# Patient Record
Sex: FEMALE | Race: OTHER | NOT HISPANIC OR LATINO | URBAN - METROPOLITAN AREA
[De-identification: names, ages, dates, MRNs, and addresses within clinical notes are randomized per-mention and may not be internally consistent; named-entity substitution may affect disease eponyms.]

---

## 2022-07-09 ENCOUNTER — TELEPHONE ENCOUNTER (OUTPATIENT)
Dept: URBAN - METROPOLITAN AREA CLINIC 121 | Facility: CLINIC | Age: 72
End: 2022-07-09

## 2022-07-09 RX ORDER — CHLORTHALIDONE 50 MG/1
TABLET ORAL TWICE A DAY
Refills: 0 | OUTPATIENT
Start: 2018-03-05 | End: 2018-05-16

## 2022-07-10 ENCOUNTER — TELEPHONE ENCOUNTER (OUTPATIENT)
Dept: URBAN - METROPOLITAN AREA CLINIC 121 | Facility: CLINIC | Age: 72
End: 2022-07-10

## 2022-07-10 RX ORDER — CHLORTHALIDONE 50 MG/1
TABLET ORAL TWICE A DAY
Refills: 0 | Status: ACTIVE | COMMUNITY
Start: 2018-05-16

## 2022-07-10 RX ORDER — HYDROCORTISONE ACETATE 25 MG/1
SUPPOSITORY RECTAL ONCE A DAY
Refills: 0 | Status: ACTIVE | COMMUNITY
Start: 2012-11-26

## 2023-09-06 ENCOUNTER — WEB ENCOUNTER (OUTPATIENT)
Dept: URBAN - METROPOLITAN AREA CLINIC 60 | Facility: CLINIC | Age: 73
End: 2023-09-06

## 2023-09-06 ENCOUNTER — DASHBOARD ENCOUNTERS (OUTPATIENT)
Age: 73
End: 2023-09-06

## 2023-09-06 ENCOUNTER — OFFICE VISIT (OUTPATIENT)
Dept: URBAN - METROPOLITAN AREA CLINIC 60 | Facility: CLINIC | Age: 73
End: 2023-09-06
Payer: MEDICARE

## 2023-09-06 VITALS
BODY MASS INDEX: 36.91 KG/M2 | WEIGHT: 188 LBS | OXYGEN SATURATION: 100 % | DIASTOLIC BLOOD PRESSURE: 80 MMHG | HEIGHT: 60 IN | TEMPERATURE: 97.5 F | SYSTOLIC BLOOD PRESSURE: 150 MMHG | HEART RATE: 74 BPM | RESPIRATION RATE: 20 BRPM

## 2023-09-06 DIAGNOSIS — Z86.010 PERSONAL HISTORY OF COLONIC POLYPS: ICD-10-CM

## 2023-09-06 PROCEDURE — 99203 OFFICE O/P NEW LOW 30 MIN: CPT | Performed by: NURSE PRACTITIONER

## 2023-09-06 RX ORDER — ERGOCALCIFEROL CAPSULES, 1.25 MG/1
1 CAPSULE CAPSULE ORAL
Status: ACTIVE | COMMUNITY

## 2023-09-06 RX ORDER — IBUPROFEN 800 MG/1
1 TABLET WITH FOOD OR MILK AS NEEDED TABLET, FILM COATED ORAL
Status: ACTIVE | COMMUNITY

## 2023-09-06 RX ORDER — LISINOPRIL 5 MG/1
1 TABLET TABLET ORAL ONCE A DAY
Status: ACTIVE | COMMUNITY

## 2023-09-06 RX ORDER — ALENDRONATE SODIUM 70 MG/1
1 TABLET 30 MINUTES BEFORE THE FIRST FOOD, BEVERAGE OR MEDICINE OF THE DAY WITH PLAIN WATER TABLET ORAL
Status: ACTIVE | COMMUNITY

## 2023-09-06 RX ORDER — METFORMIN HYDROCHLORIDE 850 MG/1
1 TABLET WITH A MEAL TABLET, FILM COATED ORAL ONCE A DAY
Status: ACTIVE | COMMUNITY

## 2023-09-06 RX ORDER — ATORVASTATIN CALCIUM 10 MG/1
1 TABLET TABLET, FILM COATED ORAL ONCE A DAY
Status: ACTIVE | COMMUNITY

## 2023-09-06 RX ORDER — DICYCLOMINE HYDROCHLORIDE 10 MG/1
2 CAPSULES CAPSULE ORAL THREE TIMES A DAY
Status: ACTIVE | COMMUNITY

## 2023-09-06 NOTE — PHYSICAL EXAM NEUROLOGIC:
Oriented to person, place and time. No gross deficit noted. , Oriented to person, place and time. No gross deficit noted. , Oriented to person, place and time. No gross deficit noted.

## 2023-09-06 NOTE — PHYSICAL EXAM PSYCH:
Normal mood with appropriate affect  , Normal mood with appropriate affect  , Normal mood with appropriate affect

## 2023-09-06 NOTE — PHYSICAL EXAM CHEST:
No lesions,  no deformities, chest wall non-tender,  no masses present, breathing is unlabored without, normal breath sounds.  , No lesions,  no deformities, chest wall non-tender,  no masses present, breathing is unlabored without, normal breath sounds.  , No lesions,  no deformities, chest wall non-tender,  no masses present, breathing is unlabored without, normal breath sounds.

## 2023-09-06 NOTE — HPI-TODAY'S VISIT:
3/18 Patient here for her screening colonoscopy, she has no GI complaints. Denies any CRC, last colonoscopy in 2012 with evidence of hyperplastic polyps. Patient will have colonoscopy. 5/18 Patient here today in good general state, her colonoscopy shows internal hemorrhoids and one hyperplastic polyp. Denies any GI problems. 9/23 Patient last colonoscopy was done more than 5 years ago.  The procedure shows evidence of a small benign polyp.  Patient denies any personal or family history of colon rectal cancer, rectal bleeding, or change in her bowel habits.  Today she is in good general state she has no GI complaints.

## 2023-09-06 NOTE — PHYSICAL EXAM NECK/THYROID:
Normal appearance, without tenderness upon palpation, no deformities, trachea midline, no masses , thyroid nontender. , Normal appearance, without tenderness upon palpation, no deformities, trachea midline, no masses , thyroid nontender. , Normal appearance, without tenderness upon palpation, no deformities, trachea midline, no masses , thyroid nontender.

## 2023-09-06 NOTE — PHYSICAL EXAM CARDIOVASCULAR:
No edema,  no murmurs,  regular rate and rhythm. S1 and S2 present and normal , No edema,  no murmurs,  regular rate and rhythm. S1 and S2 present and normal , No edema,  no murmurs,  regular rate and rhythm. S1 and S2 present and normal

## 2023-09-06 NOTE — PHYSICAL EXAM SKIN:
No rashes, no jaundice present , good turgor , no masses. , No rashes, no jaundice present , good turgor , no masses. , No rashes, no jaundice present , good turgor , no masses.

## 2023-09-06 NOTE — PHYSICAL EXAM MUSCULOSKELETAL:
Normal gait and station , no tenderness or deformities present. Upper and lower extremities normal. , Normal gait and station , no tenderness or deformities present. Upper and lower extremities normal. , Normal gait and station , no tenderness or deformities present. Upper and lower extremities normal.

## 2023-09-06 NOTE — PHYSICAL EXAM HENT:
Head,  normocephalic,  atraumatic,  Face, within normal limits,  Ears,  External ears within normal limits,  Nose/Nasopharynx,  External nose  normal appearance, no nasal discharge,  Mouth and Throat,  Oral cavity appearance normal. Mucosa normal. Lips,  Appearance normal  , Head,  normocephalic,  atraumatic,  Face, within normal limits,  Ears,  External ears within normal limits,  Nose/Nasopharynx,  External nose  normal appearance, no nasal discharge,  Mouth and Throat,  Oral cavity appearance normal. Mucosa normal. Lips,  Appearance normal  , Head,  normocephalic,  atraumatic,  Face, within normal limits,  Ears,  External ears within normal limits,  Nose/Nasopharynx,  External nose  normal appearance, no nasal discharge,  Mouth and Throat,  Oral cavity appearance normal. Mucosa normal. Lips,  Appearance normal

## 2023-09-06 NOTE — PHYSICAL EXAM EYES:
Conjunctivae and eyelids appear normal, Sclerae: White without injection or jaundice. , Conjunctivae and eyelids appear normal, Sclerae: White without injection or jaundice. , Conjunctivae and eyelids appear normal, Sclerae: White without injection or jaundice.

## 2023-09-06 NOTE — PHYSICAL EXAM GASTROINTESTINAL
Abdomen: Soft, nontender, none distended, no guarding or rigidity, no masses palpable, normal bowel sounds, no hepatosplenomegaly. , Abdomen: Soft, nontender, none distended, no guarding or rigidity, no masses palpable, normal bowel sounds, no hepatosplenomegaly. , Abdomen: Soft, nontender, none distended, no guarding or rigidity, no masses palpable, normal bowel sounds, no hepatosplenomegaly.

## 2023-09-13 ENCOUNTER — LAB OUTSIDE AN ENCOUNTER (OUTPATIENT)
Dept: URBAN - METROPOLITAN AREA CLINIC 60 | Facility: CLINIC | Age: 73
End: 2023-09-13

## 2024-09-27 ENCOUNTER — OFFICE VISIT (OUTPATIENT)
Dept: URBAN - METROPOLITAN AREA CLINIC 60 | Facility: CLINIC | Age: 74
End: 2024-09-27